# Patient Record
Sex: FEMALE | Race: WHITE | Employment: STUDENT
[De-identification: names, ages, dates, MRNs, and addresses within clinical notes are randomized per-mention and may not be internally consistent; named-entity substitution may affect disease eponyms.]

---
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
## 2020-01-06 ENCOUNTER — OFFICE VISIT (OUTPATIENT)
Dept: FAMILY MEDICINE | Facility: CLINIC | Age: 20
End: 2020-01-06
Payer: COMMERCIAL

## 2020-01-06 VITALS
BODY MASS INDEX: 34.6 KG/M2 | WEIGHT: 188 LBS | TEMPERATURE: 98.7 F | SYSTOLIC BLOOD PRESSURE: 114 MMHG | HEIGHT: 62 IN | OXYGEN SATURATION: 99 % | HEART RATE: 90 BPM | DIASTOLIC BLOOD PRESSURE: 70 MMHG

## 2020-01-06 DIAGNOSIS — N94.6 DYSMENORRHEA: Primary | ICD-10-CM

## 2020-01-06 PROCEDURE — 99203 OFFICE O/P NEW LOW 30 MIN: CPT | Performed by: PHYSICIAN ASSISTANT

## 2020-01-06 RX ORDER — LEVONORGESTREL/ETHIN.ESTRADIOL 0.1-0.02MG
1 TABLET ORAL DAILY
Qty: 84 TABLET | Refills: 3 | Status: SHIPPED | OUTPATIENT
Start: 2020-01-06 | End: 2020-11-17

## 2020-01-06 SDOH — HEALTH STABILITY: MENTAL HEALTH: HOW OFTEN DO YOU HAVE A DRINK CONTAINING ALCOHOL?: NEVER

## 2020-01-06 ASSESSMENT — PAIN SCALES - GENERAL: PAINLEVEL: NO PAIN (0)

## 2020-01-06 ASSESSMENT — MIFFLIN-ST. JEOR: SCORE: 1584.98

## 2020-01-06 NOTE — NURSING NOTE
"Chief Complaint   Patient presents with     Contraception     discuss       Initial BP (!) 140/81   Pulse 90   Temp 98.7  F (37.1  C) (Oral)   Ht 1.581 m (5' 2.25\")   Wt 85.3 kg (188 lb)   LMP 12/31/2019   SpO2 99%   BMI 34.11 kg/m   Estimated body mass index is 34.11 kg/m  as calculated from the following:    Height as of this encounter: 1.581 m (5' 2.25\").    Weight as of this encounter: 85.3 kg (188 lb).  Medication Reconciliation: complete    BEBE Welch MA    "

## 2020-01-06 NOTE — PROGRESS NOTES
"Subjective     Oneyda Chisholm is a 19 year old female who presents to clinic today for the following health issues:    HPI     Discuss menstrual pain and birth control.   She has dysmenorrhea. She is considering oral contraception for management and her to discuss options.   Menses is regular.   She has never been sexually active.     No history of blood clots or smoking.         There is no problem list on file for this patient.    Past Surgical History:   Procedure Laterality Date     TONSILLECTOMY  2007       Social History     Tobacco Use     Smoking status: Never Smoker     Smokeless tobacco: Never Used   Substance Use Topics     Alcohol use: Never     Frequency: Never     Family History   Problem Relation Age of Onset     Other Cancer Father      Colon Cancer Maternal Grandfather      Diabetes Paternal Grandmother          No Known Allergies  No lab results found.   BP Readings from Last 3 Encounters:   01/06/20 114/70   02/05/18 118/54   01/27/18 120/74 (82 %/ 83 %)*     *BP percentiles are based on the 2017 AAP Clinical Practice Guideline for girls    Wt Readings from Last 3 Encounters:   01/06/20 85.3 kg (188 lb) (96 %)*   02/05/18 85.3 kg (188 lb) (96 %)*   01/27/18 85 kg (187 lb 4.8 oz) (96 %)*     * Growth percentiles are based on CDC (Girls, 2-20 Years) data.                    Reviewed and updated as needed this visit by Provider  Tobacco  Allergies  Meds  Problems  Med Hx  Surg Hx  Fam Hx         Review of Systems   ROS COMP: Constitutional, HEENT, cardiovascular, pulmonary, GI, , musculoskeletal, neuro, skin, endocrine and psych systems are negative, except as otherwise noted.      Objective    /70   Pulse 90   Temp 98.7  F (37.1  C) (Oral)   Ht 1.581 m (5' 2.25\")   Wt 85.3 kg (188 lb)   LMP 12/31/2019   SpO2 99%   BMI 34.11 kg/m    Body mass index is 34.11 kg/m .  Physical Exam   GENERAL: healthy, alert and no distress  RESP: lungs clear to auscultation - no rales, rhonchi or " wheezes  CV: regular rate and rhythm, normal S1 S2, no S3 or S4, no murmur, click or rub, no peripheral edema and peripheral pulses strong  MS: no gross musculoskeletal defects noted, no edema  SKIN: no suspicious lesions or rashes  PSYCH: mentation appears normal, affect normal/bright    Diagnostic Test Results:  Labs reviewed in Epic        Assessment & Plan     1. Dysmenorrhea  Start OCP with her next menses.   All forms of contraception discussed. Risks, benefits and side effects of all of these medications reviewed. She has chosen OCP for management. She will notify if concerns.   - levonorgestrel-ethinyl estradiol (AVIANE/ALESSE/LESSINA) 0.1-20 MG-MCG tablet; Take 1 tablet by mouth daily  Dispense: 84 tablet; Refill: 3     20 minutes spent with Oneyda today. Over 50% of time taken for discussion of above, counseling and coordination of care.       Return in about 1 year (around 1/6/2021).    Kristen M. Kehr, PA-C  [facility]

## 2020-11-16 DIAGNOSIS — N94.6 DYSMENORRHEA: ICD-10-CM

## 2020-11-17 RX ORDER — LEVONORGESTREL/ETHIN.ESTRADIOL 0.1-0.02MG
1 TABLET ORAL DAILY
Qty: 84 TABLET | Refills: 0 | Status: SHIPPED | OUTPATIENT
Start: 2020-11-17 | End: 2021-01-12

## 2021-01-12 ENCOUNTER — VIRTUAL VISIT (OUTPATIENT)
Dept: FAMILY MEDICINE | Facility: CLINIC | Age: 21
End: 2021-01-12
Payer: COMMERCIAL

## 2021-01-12 DIAGNOSIS — N94.6 DYSMENORRHEA: ICD-10-CM

## 2021-01-12 PROCEDURE — 99213 OFFICE O/P EST LOW 20 MIN: CPT | Mod: 95 | Performed by: PHYSICIAN ASSISTANT

## 2021-01-12 RX ORDER — LEVONORGESTREL/ETHIN.ESTRADIOL 0.1-0.02MG
1 TABLET ORAL DAILY
Qty: 84 TABLET | Refills: 3 | Status: SHIPPED | OUTPATIENT
Start: 2021-01-12 | End: 2021-12-20

## 2021-01-12 NOTE — PROGRESS NOTES
Oneyda is a 20 year old who is being evaluated via a billable video visit.      How would you like to obtain your AVS? MyChart  If the video visit is dropped, the invitation should be resent by: Send to e-mail at: [email or web address]  Will anyone else be joining your video visit? No    Video Start Time: 11:27 AM  Assessment & Plan     Dysmenorrhea  Refill of contraception.   No concerns with medication.   She will be due for Pap screening next year.   - levonorgestrel-ethinyl estradiol (AVIANE) 0.1-20 MG-MCG tablet; Take 1 tablet by mouth daily           No follow-ups on file.    Kristen M. Kehr, PA-C  [facility]    Subjective     Oneyda is a 20 year old who presents to clinic today for the following health issues     HPI       Refill birth control.           Review of Systems   Constitutional, HEENT, cardiovascular, pulmonary, GI, , musculoskeletal, neuro, skin, endocrine and psych systems are negative, except as otherwise noted.      Objective           Vitals:  No vitals were obtained today due to virtual visit.    Physical Exam   GENERAL: Healthy, alert and no distress  EYES: Eyes grossly normal to inspection.  No discharge or erythema, or obvious scleral/conjunctival abnormalities.  RESP: No audible wheeze, cough, or visible cyanosis.  No visible retractions or increased work of breathing.    SKIN: Visible skin clear. No significant rash, abnormal pigmentation or lesions.  NEURO: Cranial nerves grossly intact.  Mentation and speech appropriate for age.  PSYCH: Mentation appears normal, affect normal/bright, judgement and insight intact, normal speech and appearance well-groomed.                Video-Visit Details    Type of service:  Video Visit    Video End Time:11:29 AM    Originating Location (pt. Location): Home    Distant Location (provider location):  [facility]     Platform used for Video Visit: F[employer]ING [employer]

## 2021-01-15 ENCOUNTER — HEALTH MAINTENANCE LETTER (OUTPATIENT)
Age: 21
End: 2021-01-15

## 2021-04-03 ENCOUNTER — HEALTH MAINTENANCE LETTER (OUTPATIENT)
Age: 21
End: 2021-04-03

## 2021-06-01 VITALS — WEIGHT: 194.56 LBS | HEIGHT: 62 IN | BODY MASS INDEX: 35.8 KG/M2

## 2021-06-19 NOTE — PROGRESS NOTES
[facility] Well Child Check    ASSESSMENT & PLAN  Oneyda Chisholm is a 18 y.o. who has normal growth and normal development.    Diagnoses and all orders for this visit:    [facility] (well child check)  -     PHQ9 Depression Screen  -     Vision Screening  -     Hearing Screening  -     Hemoglobin    Other orders  -     Meningococcal MCV4P  -     HPV vaccine 9 valent 3 dose IM      Return to clinic in 1 year for a Well Child Check or sooner as needed    IMMUNIZATIONS/LABS  Immunizations were reviewed and orders were placed as appropriate.    REFERRALS  Dental:  Recommend routine dental care as appropriate., The patient has already established care with a dentist.  Other:  No additional referrals were made at this time.    ANTICIPATORY GUIDANCE  I have reviewed age appropriate anticipatory guidance.    HEALTH HISTORY  Do you have any concerns that you'd like to discuss today?: No concerns       Roomed by: Tere HUFFMAN CMA    Refills needed? No    Do you have any forms that need to be filled out? Yes college forms/sports forms     services provided by:  [facility]   /Agency Name  na   Location of  Services:  na   Are you using a form of contraception? No    If no, would you like to discuss with your clinician today? No        Do you have any significant health concerns in your family history?: No  No family history on file.  Since your last visit, have there been any major changes in your family, such as a move, job change, separation, divorce, or death in the family?: No  Has a lack of transportation kept you from medical appointments?: No    Home  Who lives in your home?:  Mom and dad   Social History     Social History Narrative     Do you have any concerns about losing your housing?: No  Is your housing safe and comfortable?: Yes  Do you have any trouble with sleep?:  No    Education  What school do you child attend?:  [employer]   What grade are you in?:  freshman in college   How do  you perform in school (grades, behavior, attention, homework?: good      Eating  Do you eat regular meals including fruits and vegetables?:  yes  What are you drinking (cow's milk, water, soda, juice, sports drinks, energy drinks, etc)?: water  Have you been worried that you don't have enough food?: No  Do you have concerns about your body or appearance?:  No    Activities  Do you have friends?:  yes  Do you get at least one hour of physical activity per day?:  yes  How many hours a day are you in front of a screen other than for schoolwork (computer, TV, phone)?:  3  What do you do for exercise?:  Run and weight lift   Do you have interest/participate in community activities/volunteers/school sports?:  yes, school sports     MENTAL HEALTH SCREENING  PHQ-2 Total Score: 0 (7/25/2018  1:06 PM)  PHQ-9 Total Score: 0 (7/25/2018  1:06 PM)    VISION/HEARING  Vision: Completed. See Results  Hearing:  Completed. See Results     Hearing Screening    125Hz 250Hz 500Hz 1000Hz 2000Hz 3000Hz 4000Hz 6000Hz 8000Hz   Right ear:   20 20 20  20     Left ear:   20 20 2  20        Visual Acuity Screening    Right eye Left eye Both eyes   Without correction:      With correction: 10/15 10/15 10/15   Comments: Patient sees [facility]       TB Risk Assessment:  The patient and/or parent/guardian answer positive to:  patient and/or parent/guardian answer 'no' to all screening TB questions    Dyslipidemia Risk Screening  Have either of your parents or any of your grandparents had a stroke or heart attack before age 55?: No  Any parents with high cholesterol or currently taking medications to treat?: No     Dental  When was the last time you saw the dentist?: Less than 30 days ago.  Approx date (required): 4/20/2018   Last fluoride varnish application was within the past 30 days. Fluoride not applied today.      Patient Active Problem List   Diagnosis     Lower Back Pain     Otitis Externa       Drugs  Does the patient use  "tobacco/alcohol/drugs?:  no    Safety  Does the patient have any safety concerns (peer or home)?:  no  Does the patient use safety belts, helmets and other safety equipment?:  yes    Sex  Have you ever had sex?:  No    MEASUREMENTS  Height:  5' 2.25\" (1.581 m)  Weight: 194 lb 9 oz (88.3 kg)  BMI: Body mass index is 35.3 kg/(m^2).  Blood Pressure: 102/58  Blood pressure percentiles are 24 % systolic and 27 % diastolic based on NHBPEP's 4th Report. Blood pressure percentile targets: 90: 123/79, 95: 127/83, 99 + 5 mmH/95.    PHYSICAL EXAM   /58 (Patient Site: Left Arm, Patient Position: Sitting, Cuff Size: Adult Large)  Pulse 70  Temp 97.9  F (36.6  C) (Oral)   Resp 14  Ht 5' 2.25\" (1.581 m)  Wt 194 lb 9 oz (88.3 kg)  LMP 2018  BMI 35.3 kg/m2  General appearance: alert, appears stated age and cooperative  Eyes: conjunctivae/corneas clear. PERRL, EOM's intact. Fundi benign.  Ears: normal TM's and external ear canals both ears  Nose: Nares normal. Septum midline. Mucosa normal. No drainage or sinus tenderness.  Throat: lips, mucosa, and tongue normal; teeth and gums normal  Neck: no adenopathy, no carotid bruit, no JVD, supple, symmetrical, trachea midline and thyroid not enlarged, symmetric, no tenderness/mass/nodules  Back: symmetric, no curvature. ROM normal. No CVA tenderness.  Lungs: clear to auscultation bilaterally  Heart: regular rate and rhythm, S1, S2 normal, no murmur, click, rub or gallop  Abdomen: soft, non-tender; bowel sounds normal; no masses,  no organomegaly  Extremities: extremities normal, atraumatic, no cyanosis or edema  Pulses: 2+ and symmetric  Skin: Skin color, texture, turgor normal. No rashes or lesions  Neurologic: Grossly normal  "

## 2021-09-18 ENCOUNTER — HEALTH MAINTENANCE LETTER (OUTPATIENT)
Age: 21
End: 2021-09-18

## 2022-01-11 ENCOUNTER — VIRTUAL VISIT (OUTPATIENT)
Dept: FAMILY MEDICINE | Facility: CLINIC | Age: 22
End: 2022-01-11
Payer: COMMERCIAL

## 2022-01-11 DIAGNOSIS — N94.6 DYSMENORRHEA: ICD-10-CM

## 2022-01-11 PROCEDURE — 99212 OFFICE O/P EST SF 10 MIN: CPT | Mod: GT | Performed by: FAMILY MEDICINE

## 2022-01-11 RX ORDER — LEVONORGESTREL/ETHIN.ESTRADIOL 0.1-0.02MG
1 TABLET ORAL DAILY
Qty: 84 TABLET | Refills: 1 | Status: SHIPPED | OUTPATIENT
Start: 2022-01-11

## 2022-01-11 NOTE — PROGRESS NOTES
Oneyda is a 21 year old who is being evaluated via a billable video visit.      How would you like to obtain your AVS? MyChart  If the video visit is dropped, the invitation should be resent by: Send to e-mail at: [email or web address]  Will anyone else be joining your video visit? No      Video Start Time: 3:40    1. Dysmenorrhea  Oneyda presents virtually mainly for birth control pill refill.  She is taking this both for pregnancy prevention and dysmenorrhea.  She has been sexually active.  I discussed with her that she is overdue for a physical, Pap smear, and STD screening.  I will refill her birth control, but she needs to get this scheduled in the next 3 to 6 months.  - levonorgestrel-ethinyl estradiol (VIENVA) 0.1-20 MG-MCG tablet; Take 1 tablet by mouth daily  Dispense: 84 tablet; Refill: 1      Subjective   Oneyda is a 21 year old who presents for the following health issues   HPI   Oneyda presents virtually for birth control pill refill.  She is new to me today.  She states she started this for dysmenorrhea but has been sexually active so she is using it for pregnancy prevention.  She is never had a Pap smear.  She is overdue for STD screening.  She states she typically has gone to a clinic in [place] for physicals as this has been closer for her.  She expresses understanding that she needs to have a follow-up appointment before any further refills.  She is a student at [employer].  {    Objective           Vitals:  No vitals were obtained today due to virtual visit.    Physical Exam   GENERAL: Healthy, alert and no distress  RESP: No audible wheeze, cough, or visible cyanosis.  No visible retractions or increased work of breathing.    PSYCH: Mentation appears normal, affect normal/bright, judgement and insight intact, normal speech and appearance well-groomed.      Video-Visit Details    Type of service:  Video Visit    Video End Time:3:50    Originating Location (pt. Location): Home    Distant  Location (provider location):  [facility]     Platform used for Video Visit: Luc[employer]

## 2022-03-05 ENCOUNTER — HEALTH MAINTENANCE LETTER (OUTPATIENT)
Age: 22
End: 2022-03-05

## 2022-11-19 ENCOUNTER — HEALTH MAINTENANCE LETTER (OUTPATIENT)
Age: 22
End: 2022-11-19

## 2023-04-09 ENCOUNTER — HEALTH MAINTENANCE LETTER (OUTPATIENT)
Age: 23
End: 2023-04-09